# Patient Record
Sex: FEMALE | Race: WHITE | NOT HISPANIC OR LATINO | Employment: FULL TIME | URBAN - METROPOLITAN AREA
[De-identification: names, ages, dates, MRNs, and addresses within clinical notes are randomized per-mention and may not be internally consistent; named-entity substitution may affect disease eponyms.]

---

## 2023-12-12 ENCOUNTER — APPOINTMENT (OUTPATIENT)
Dept: URGENT CARE | Facility: CLINIC | Age: 38
End: 2023-12-12

## 2024-09-02 ENCOUNTER — HOSPITAL ENCOUNTER (EMERGENCY)
Facility: CLINIC | Age: 39
Discharge: HOME | End: 2024-09-02
Attending: EMERGENCY MEDICINE | Admitting: EMERGENCY MEDICINE
Payer: COMMERCIAL

## 2024-09-02 VITALS
DIASTOLIC BLOOD PRESSURE: 79 MMHG | HEIGHT: 64 IN | RESPIRATION RATE: 18 BRPM | TEMPERATURE: 97.4 F | BODY MASS INDEX: 30.64 KG/M2 | SYSTOLIC BLOOD PRESSURE: 121 MMHG | OXYGEN SATURATION: 99 % | WEIGHT: 179.45 LBS | HEART RATE: 80 BPM

## 2024-09-02 DIAGNOSIS — N30.01 ACUTE CYSTITIS WITH HEMATURIA: ICD-10-CM

## 2024-09-02 LAB
ALBUMIN UR-MCNC: 100 MG/DL
APPEARANCE UR: ABNORMAL
BILIRUB UR QL STRIP: NEGATIVE
COLOR UR AUTO: YELLOW
GLUCOSE UR STRIP-MCNC: 30 MG/DL
HCG UR QL: NEGATIVE
HGB UR QL STRIP: ABNORMAL
KETONES UR STRIP-MCNC: NEGATIVE MG/DL
LEUKOCYTE ESTERASE UR QL STRIP: ABNORMAL
MUCOUS THREADS #/AREA URNS LPF: PRESENT /LPF
NITRATE UR QL: NEGATIVE
PH UR STRIP: 6 [PH] (ref 5–7)
RBC URINE: >182 /HPF
SP GR UR STRIP: 1.03 (ref 1–1.03)
SQUAMOUS EPITHELIAL: 21 /HPF
UROBILINOGEN UR STRIP-MCNC: 2 MG/DL
WBC CLUMPS #/AREA URNS HPF: PRESENT /HPF
WBC URINE: >182 /HPF

## 2024-09-02 PROCEDURE — 87563 M. GENITALIUM AMP PROBE: CPT | Performed by: EMERGENCY MEDICINE

## 2024-09-02 PROCEDURE — 250N000013 HC RX MED GY IP 250 OP 250 PS 637: Performed by: EMERGENCY MEDICINE

## 2024-09-02 PROCEDURE — 81001 URINALYSIS AUTO W/SCOPE: CPT | Performed by: EMERGENCY MEDICINE

## 2024-09-02 PROCEDURE — 87086 URINE CULTURE/COLONY COUNT: CPT | Performed by: EMERGENCY MEDICINE

## 2024-09-02 PROCEDURE — 99283 EMERGENCY DEPT VISIT LOW MDM: CPT | Performed by: EMERGENCY MEDICINE

## 2024-09-02 PROCEDURE — 81025 URINE PREGNANCY TEST: CPT | Performed by: EMERGENCY MEDICINE

## 2024-09-02 RX ORDER — GRANULES FOR ORAL 3 G/1
3 POWDER ORAL ONCE
Status: COMPLETED | OUTPATIENT
Start: 2024-09-02 | End: 2024-09-02

## 2024-09-02 RX ORDER — PHENAZOPYRIDINE HYDROCHLORIDE 100 MG/1
100 TABLET, FILM COATED ORAL 3 TIMES DAILY PRN
Qty: 9 TABLET | Refills: 0 | Status: SHIPPED | OUTPATIENT
Start: 2024-09-02 | End: 2024-09-05

## 2024-09-02 RX ADMIN — GRANULES FOR ORAL SOLUTION 3 G: 3 POWDER ORAL at 08:41

## 2024-09-02 ASSESSMENT — COLUMBIA-SUICIDE SEVERITY RATING SCALE - C-SSRS
2. HAVE YOU ACTUALLY HAD ANY THOUGHTS OF KILLING YOURSELF IN THE PAST MONTH?: NO
1. IN THE PAST MONTH, HAVE YOU WISHED YOU WERE DEAD OR WISHED YOU COULD GO TO SLEEP AND NOT WAKE UP?: NO
6. HAVE YOU EVER DONE ANYTHING, STARTED TO DO ANYTHING, OR PREPARED TO DO ANYTHING TO END YOUR LIFE?: NO

## 2024-09-02 ASSESSMENT — ACTIVITIES OF DAILY LIVING (ADL): ADLS_ACUITY_SCORE: 33

## 2024-09-02 NOTE — ED PROVIDER NOTES
"  Emergency Department Note      History of Present Illness     Chief Complaint   Urinary Frequency      HPI   Christi Sun is a 39 year old female who presents to the ED with urinary frequency. Patient reports increasing urinary frequency for 1.5 weeks and was seen at ED in [place], where she was diagnosed with a UTI and given cephalexon. Patient states her occupation as a  and travels a lot. She states her symptoms were starting to get better until yesterday it started to worsen. Patient endorses dysuria and lower abdominal and back pain. Patient notes her menstrual cycle started today. Patient is sexually active with 1 partner. She expresses no concern for STI. No abnormal vaginal discharge, fever, vomiting, nausea, or rashes.     Independent Historian   None    Review of External Notes   None    Past Medical History     Medical History and Problem List   Depression  UTI    Medications   None    Surgical History    section  Fracture surgery   Tubal ligation  Gastric bypass  Bariatric     Physical Exam     Patient Vitals for the past 24 hrs:   BP Temp Temp src Pulse Resp SpO2 Height Weight   24 0708 121/79 97.4  F (36.3  C) Temporal 80 18 99 % 1.626 m (5' 4\") 81.4 kg (179 lb 7.3 oz)     Physical Exam  General: the patient is awake and interactive; well appearing.   HEENT:  Moist mucous membranes, conjunctiva normal  Pulmonary:  Normal respiratory effort  Cardiovascular:  Skin well perfused  Abdomen: Soft, nontender, nondistended. No guarding or rebound tenderness.    Musculoskeletal:  Moving 4 extremities grossly wnl, no deformities; no CVA tenderness  Neuro:  Speech normal, no focal deficits  Psych:  Normal affect    Diagnostics     Lab Results   Labs Ordered and Resulted from Time of ED Arrival to Time of ED Departure   ROUTINE UA WITH MICROSCOPIC REFLEX TO CULTURE - Abnormal       Result Value    Color Urine Yellow      Appearance Urine Cloudy (*)     Glucose Urine 30 (*)     " Bilirubin Urine Negative      Ketones Urine Negative      Specific Gravity Urine 1.031      Blood Urine Large (*)     pH Urine 6.0      Protein Albumin Urine 100 (*)     Urobilinogen Urine 2.0      Nitrite Urine Negative      Leukocyte Esterase Urine Large (*)     WBC Clumps Urine Present (*)     Mucus Urine Present (*)     RBC Urine >182 (*)     WBC Urine >182 (*)     Squamous Epithelials Urine 21 (*)    HCG QUALITATIVE URINE - Normal    hCG Urine Qualitative Negative     UROGENITAL UREAPLASMA AND MYCOPLASMA SPECIES BY PCR   URINE CULTURE       Imaging   No orders to display       EKG   None    Independent Interpretation   None    ED Course      Medications Administered   Medications   fosfomycin (MONUROL) Packet 3 g (has no administration in time range)       Procedures   None    Discussion of Management   None    ED Course   ED Course as of 09/02/24 0830   Mon Sep 02, 2024   0721 I evaluated the patient, obtained history, and performed a physical exam as detailed above.    0820 I rechecked and updated the patient on the plan of care.        Additional Documentation  None    Medical Decision Making / Diagnosis     CMS Diagnoses: None    MIPS   None    MDM   Christi Sun is a 39 year old female with prior history of UTIs presenting to the ER for evaluation of dysuria and urinary frequency.  Reports being treated for UTI 1.5 weeks prior with Keflex which she was diagnosed with UTI in [place].  She is afebrile vitally stable.  Reports some relief with antibiotics but returned the day after she completed them.  She has a completely benign abdominal exam.  States that she started her period today and has no concern for STI. UA shows pyuria and hematuria and large leukocyte esterase.  Hard to tell if this is contaminant given squamous cells present but we will add on a urine culture.  Given her overall symptomatology, we will treat for acute cystitis with fosfomycin.  Signs/symptoms are not consistent with  concomitant kidney stone or pyelonephritis.  She has no systemic symptoms.  We will add mycoplasma/Ureaplasma for other possible causes for chronic urethritis that she has recurrent UTIs.  Reports seeing urology many years ago and has had discussions of needing chronic low-dose antibiotics given her multiple UTIs.  Recommend follow-up with urology for this and she is agreeable.  Discussed return precautions for the emergency department.  All questions were answered prior to discharge.    Disposition   The patient was discharged.     Diagnosis     ICD-10-CM    1. Acute cystitis with hematuria  N30.01            Discharge Medications   New Prescriptions    PHENAZOPYRIDINE (PYRIDIUM) 100 MG TABLET    Take 1 tablet (100 mg) by mouth 3 times daily as needed for urinary tract discomfort or pain.       Scribe Disclosure:  Maria Luisa BHANDARI, am serving as a scribe at 8:22 AM on 9/2/2024 to document services personally performed by Magnus Kelly MD based on my observations and the provider's statements to me.        Magnus Kelly MD  09/02/24 9810

## 2024-09-02 NOTE — ED TRIAGE NOTES
Pt presents with urinary frequency and burning x 1.5 weeks. Reports taking 7 day abx course with no relief. Endorses back cramping. Unsure if hematuria or started menstrual cycle today. A & Ox4.      Triage Assessment (Adult)       Row Name 09/02/24 0706          Triage Assessment    Airway WDL WDL        Respiratory WDL    Respiratory WDL WDL        Cardiac WDL    Cardiac WDL WDL        Cognitive/Neuro/Behavioral WDL    Cognitive/Neuro/Behavioral WDL WDL

## 2024-09-03 LAB — BACTERIA UR CULT: NORMAL

## 2024-09-04 LAB
M GENITALIUM DNA SPEC QL NAA+PROBE: NOT DETECTED
M HOMINIS DNA SPEC QL NAA+PROBE: NOT DETECTED
U PARVUM DNA SPEC QL NAA+PROBE: DETECTED
U UREALYTICUM DNA SPEC QL NAA+PROBE: NOT DETECTED

## 2024-09-05 ENCOUNTER — TELEPHONE (OUTPATIENT)
Dept: NURSING | Facility: CLINIC | Age: 39
End: 2024-09-05
Payer: COMMERCIAL

## 2024-09-05 NOTE — TELEPHONE ENCOUNTER
"[facility]    Reason for call: Lab Result Notification     Lab Result (including Rx patient on, if applicable).  If culture, copy of lab report at bottom.  Lab Result:    9/2/2024  8:30 AM   Ureaplasma parvum by PCR Detected !    Ureaplasma urealyticum by PCR Not Detected    Mycoplasma hominis by PCR Not Detected    Mycoplasma genitalium by PCR Not Detected       Legend:  ! Abnormal    Creatinine Level (mg/dl) No results found for: \"CR\" Creatinine clearance (ml/min), if applicable    Creatinine clearance cannot be calculated (No successful lab value found.)     RN Recommendations/Instructions per [facility] ED lab result protocol:   [facility] lab result protocol utilized: No Protocol (Misc)      Patient's current Symptoms:   Patient states that she is feeling better after taking the Fosfomycin.  She states that her symptoms seem to be gone.  Patient is concerned about recurrence. She is a  so she is only home every couple of months.  She states that she has a \"34 hour reset\" coming up and will contact us if her symptoms seem to be coming back.     Patient/care giver notified to contact your PCP clinic or return to the Emergency department if your:  Symptoms return.       FRANTZ MCKENZIE RN  "